# Patient Record
Sex: FEMALE | Race: WHITE | Employment: FULL TIME | URBAN - METROPOLITAN AREA
[De-identification: names, ages, dates, MRNs, and addresses within clinical notes are randomized per-mention and may not be internally consistent; named-entity substitution may affect disease eponyms.]

---

## 2017-05-20 ENCOUNTER — HOSPITAL ENCOUNTER (EMERGENCY)
Age: 42
Discharge: HOME | End: 2017-05-20
Attending: EMERGENCY MEDICINE
Payer: MEDICAID

## 2017-05-20 VITALS
OXYGEN SATURATION: 99 % | DIASTOLIC BLOOD PRESSURE: 76 MMHG | HEART RATE: 77 BPM | RESPIRATION RATE: 16 BRPM | HEIGHT: 64 IN | WEIGHT: 115 LBS | SYSTOLIC BLOOD PRESSURE: 134 MMHG | TEMPERATURE: 98.9 F | BODY MASS INDEX: 19.63 KG/M2

## 2017-05-20 DIAGNOSIS — G89.29 OTHER CHRONIC PAIN: Primary | ICD-10-CM

## 2017-05-20 LAB
AMPHET UR QL SCN: NEGATIVE
BARBITURATES UR QL SCN: NEGATIVE
BENZODIAZ UR QL: NEGATIVE
CANNABINOIDS UR QL SCN: POSITIVE
COCAINE UR QL SCN: NEGATIVE
METHADONE UR QL: NEGATIVE
OPIATES UR QL: NEGATIVE
PCP UR QL: NEGATIVE

## 2017-05-20 PROCEDURE — 81003 URINALYSIS AUTO W/O SCOPE: CPT | Performed by: EMERGENCY MEDICINE

## 2017-05-20 PROCEDURE — 80307 DRUG TEST PRSMV CHEM ANLYZR: CPT | Performed by: EMERGENCY MEDICINE

## 2017-05-20 PROCEDURE — 99285 EMERGENCY DEPT VISIT HI MDM: CPT | Performed by: EMERGENCY MEDICINE

## 2017-05-20 PROCEDURE — 96372 THER/PROPH/DIAG INJ SC/IM: CPT | Performed by: EMERGENCY MEDICINE

## 2017-05-20 PROCEDURE — 74011250637 HC RX REV CODE- 250/637: Performed by: EMERGENCY MEDICINE

## 2017-05-20 PROCEDURE — 74011250636 HC RX REV CODE- 250/636: Performed by: EMERGENCY MEDICINE

## 2017-05-20 RX ORDER — DIVALPROEX SODIUM 250 MG/1
250 TABLET, EXTENDED RELEASE ORAL
COMMUNITY

## 2017-05-20 RX ORDER — DIAZEPAM 5 MG/1
5 TABLET ORAL
Status: COMPLETED | OUTPATIENT
Start: 2017-05-20 | End: 2017-05-20

## 2017-05-20 RX ORDER — TRAMADOL HYDROCHLORIDE 50 MG/1
50-100 TABLET ORAL
Qty: 30 TAB | Refills: 0 | Status: SHIPPED | OUTPATIENT
Start: 2017-05-20

## 2017-05-20 RX ORDER — NAPROXEN SODIUM 220 MG
220 TABLET ORAL 2 TIMES DAILY WITH MEALS
COMMUNITY

## 2017-05-20 RX ORDER — GABAPENTIN 300 MG/1
600 CAPSULE ORAL 3 TIMES DAILY
COMMUNITY

## 2017-05-20 RX ORDER — DOCUSATE SODIUM 100 MG/1
100 CAPSULE, LIQUID FILLED ORAL 2 TIMES DAILY
COMMUNITY

## 2017-05-20 RX ORDER — KETOROLAC TROMETHAMINE 30 MG/ML
30 INJECTION, SOLUTION INTRAMUSCULAR; INTRAVENOUS ONCE
Status: COMPLETED | OUTPATIENT
Start: 2017-05-20 | End: 2017-05-20

## 2017-05-20 RX ORDER — CYCLOBENZAPRINE HCL 10 MG
10 TABLET ORAL
COMMUNITY

## 2017-05-20 RX ORDER — DIAZEPAM 5 MG/1
5 TABLET ORAL
COMMUNITY

## 2017-05-20 RX ORDER — SERTRALINE HYDROCHLORIDE 50 MG/1
50 TABLET, FILM COATED ORAL DAILY
COMMUNITY

## 2017-05-20 RX ORDER — ALBUTEROL SULFATE 90 UG/1
2 AEROSOL, METERED RESPIRATORY (INHALATION)
COMMUNITY

## 2017-05-20 RX ORDER — BACLOFEN 10 MG/1
10 TABLET ORAL
COMMUNITY

## 2017-05-20 RX ADMIN — DIAZEPAM 5 MG: 5 TABLET ORAL at 09:21

## 2017-05-20 RX ADMIN — KETOROLAC TROMETHAMINE 30 MG: 30 INJECTION, SOLUTION INTRAMUSCULAR at 09:21

## 2017-05-20 NOTE — DISCHARGE INSTRUCTIONS

## 2017-05-20 NOTE — ED TRIAGE NOTES
Pt arrived via ems with the complaint of all over pain from a cyst on her back that has been hurting her for the past 7 years. Pt states arm, leg, neck, back , chest pain for the past 7 years.

## 2017-05-20 NOTE — ED PROVIDER NOTES
HPI Comments: Patient presents to return complaining of exacerbation of chronic pain syndromes. 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 Graham Baird, affiliated with the [facility]. She reports exacerbation of pain secondary to daily activities. She denies any new or unusual complaints fever chills or trauma. She is complaining of pain in the upper extremities and pain intermittently radiating down the lower extremities. Review of her medications show that she does have prescriptions for baclofen and diazepam and a nonsteroidal from Dr. Graham Baird. She is currently out of her Valium but states she has a prescription refill waiting for her at the pharmacy. Review of systems is otherwise negative    Patient is a 42 y.o. female presenting with general illness. The history is provided by the patient. Generalized Body Aches   This is a chronic problem. The current episode started 12 to 24 hours ago. The problem occurs constantly. The problem has not changed since onset. Exacerbated by: activity. Nothing relieves the symptoms. She has tried nothing for the symptoms. The treatment provided no relief. Past Medical History:   Diagnosis Date    Cancer ([address]     Ill-defined condition     neuropathy    Neurological disorder        Past Surgical History:   Procedure Laterality Date    HX GYN      HX ORTHOPAEDIC           History reviewed. No pertinent family history. Social History     Social History    Marital status: LEGALLY      Spouse name: N/A    Number of children: N/A    Years of education: N/A     Occupational History    Not on file.      Social History Main Topics    Smoking status: Current Every Day Smoker    Smokeless tobacco: Not on file    Alcohol use No    Drug use: Yes     Special: Marijuana    Sexual activity: Not on file Other Topics Concern    Not on file     Social History Narrative    No narrative on file         ALLERGIES: Embeda [morphine-naltrexone]; Pcn [penicillins]; and Bactrim [sulfamethoprim ds]    Review of Systems   All other systems reviewed and are negative. Vitals:    05/20/17 0754 05/20/17 0758 05/20/17 0830   BP:  147/81 138/80   Pulse: 70  78   Resp: 18     Temp: 97.4 °F (36.3 °C)     SpO2: 100%  99%   Weight: 52.2 kg (115 lb)     Height: 5' 4\" (1.626 m)              Physical Exam   Constitutional: She is oriented to person, place, and time. She appears well-developed and well-nourished. No distress. HENT:   Head: Normocephalic and atraumatic. Eyes: Conjunctivae and EOM are normal. Pupils are equal, round, and reactive to light. Neck: Normal range of motion. Neck supple. Cardiovascular: Normal rate, regular rhythm and normal heart sounds. Pulmonary/Chest: Effort normal and breath sounds normal.   Abdominal: Soft. Bowel sounds are normal.   Musculoskeletal: Normal range of motion. Neurological: She is alert and oriented to person, place, and time. Skin: Skin is warm and dry. Psychiatric: She has a normal mood and affect. Her behavior is normal.   Nursing note and vitals reviewed.        MDM  Number of Diagnoses or Management Options  Other chronic pain:      Amount and/or Complexity of Data Reviewed  Review and summarize past medical records: yes    Risk of Complications, Morbidity, and/or Mortality  Presenting problems: low  Diagnostic procedures: minimal  Management options: low    Patient Progress  Patient progress: stable    ED Course       Procedures